# Patient Record
Sex: FEMALE | Race: WHITE | URBAN - METROPOLITAN AREA
[De-identification: names, ages, dates, MRNs, and addresses within clinical notes are randomized per-mention and may not be internally consistent; named-entity substitution may affect disease eponyms.]

---

## 2017-11-08 ENCOUNTER — OFFICE VISIT - RIVER FALLS (OUTPATIENT)
Dept: FAMILY MEDICINE | Facility: CLINIC | Age: 82
End: 2017-11-08

## 2017-11-08 ASSESSMENT — MIFFLIN-ST. JEOR: SCORE: 1295.64

## 2019-01-01 ENCOUNTER — NURSE TRIAGE (OUTPATIENT)
Dept: NURSING | Facility: CLINIC | Age: 84
End: 2019-01-01

## 2019-01-01 ENCOUNTER — OFFICE VISIT - RIVER FALLS (OUTPATIENT)
Dept: FAMILY MEDICINE | Facility: CLINIC | Age: 84
End: 2019-01-01

## 2019-01-01 ENCOUNTER — COMMUNICATION - RIVER FALLS (OUTPATIENT)
Dept: FAMILY MEDICINE | Facility: CLINIC | Age: 84
End: 2019-01-01

## 2019-01-01 LAB
A/G RATIO - HISTORICAL: 1 (ref 1–2.5)
ALBUMIN SERPL-MCNC: 2.8 GM/DL (ref 3.6–5.1)
ALP SERPL-CCNC: 100 UNIT/L (ref 33–130)
ALT SERPL W P-5'-P-CCNC: 19 UNIT/L (ref 6–29)
AST SERPL W P-5'-P-CCNC: 20 UNIT/L (ref 10–35)
BILIRUB SERPL-MCNC: 1.7 MG/DL (ref 0.2–1.2)
BUN SERPL-MCNC: 28 MG/DL (ref 7–25)
BUN/CREAT RATIO - HISTORICAL: 35 (ref 6–22)
CALCIUM SERPL-MCNC: 8.8 MG/DL (ref 8.6–10.4)
CHLORIDE BLD-SCNC: 91 MMOL/L (ref 98–110)
CO2 SERPL-SCNC: 23 MMOL/L (ref 20–32)
CREAT SERPL-MCNC: 0.81 MG/DL (ref 0.6–0.88)
EGFRCR SERPLBLD CKD-EPI 2021: 65 ML/MIN/1.73M2
ERYTHROCYTE [DISTWIDTH] IN BLOOD BY AUTOMATED COUNT: 14.3 % (ref 11–15)
GLOBULIN: 2.9 (ref 1.9–3.7)
GLUCOSE BLD-MCNC: 75 MG/DL (ref 65–99)
HCT VFR BLD AUTO: 38.9 % (ref 35–45)
HGB BLD-MCNC: 13.4 GM/DL (ref 11.7–15.5)
MCH RBC QN AUTO: 30.3 PG (ref 27–33)
MCHC RBC AUTO-ENTMCNC: 34.4 GM/DL (ref 32–36)
MCV RBC AUTO: 88 FL (ref 80–100)
PLATELET # BLD AUTO: 227 10*3/UL (ref 140–400)
PMV BLD: 10.4 FL (ref 7.5–12.5)
POTASSIUM BLD-SCNC: 4.4 MMOL/L (ref 3.5–5.3)
PROT SERPL-MCNC: 5.7 GM/DL (ref 6.1–8.1)
RBC # BLD AUTO: 4.42 10*6/UL (ref 3.8–5.1)
SODIUM SERPL-SCNC: 133 MMOL/L (ref 135–146)
TSH SERPL DL<=0.005 MIU/L-ACNC: 6.84 MIU/L (ref 0.4–4.5)
WBC # BLD AUTO: 8.3 10*3/UL (ref 3.8–10.8)

## 2019-07-19 NOTE — TELEPHONE ENCOUNTER
Reason for call; Pharmacist called for prior authorization for Rx that was order yesterday  .   Recommendation / teaching ; FNA reviewed Epic  chart and advised pharmacist  That last Rx visible Rx  Order on my form of Epic was from  [facility] provider  Dr Frank Hernandez on 9/20/2011 .      Caller Verbalizes understanding and denies further questions and will call back if further symptoms to triage or questions  .  Leora Suggs RN  - [place] Nurse Advisor

## 2022-02-11 VITALS
SYSTOLIC BLOOD PRESSURE: 126 MMHG | HEART RATE: 72 BPM | HEART RATE: 72 BPM | TEMPERATURE: 97.7 F | DIASTOLIC BLOOD PRESSURE: 76 MMHG | DIASTOLIC BLOOD PRESSURE: 64 MMHG | SYSTOLIC BLOOD PRESSURE: 116 MMHG

## 2022-02-11 VITALS
HEART RATE: 86 BPM | OXYGEN SATURATION: 97 % | BODY MASS INDEX: 39.38 KG/M2 | HEIGHT: 61 IN | WEIGHT: 208.6 LBS | DIASTOLIC BLOOD PRESSURE: 68 MMHG | SYSTOLIC BLOOD PRESSURE: 120 MMHG

## 2022-02-16 NOTE — PROGRESS NOTES
Patient:   OTILIA GARVIN            MRN: [id number]            FIN: [id number]               Age:   86 years     Sex:  Female     :  1931   Associated Diagnoses:   Hypothyroid; Hearing loss; Dizziness   Author:   Peterson Barrios MD      Chief Complaint   2017 11:34 AM CST   Patient here for a thyroid check.      History of Present Illness   Patient here for thyroid follow up. Has been generally doing well. Occasionally having issues with dizziness. Has been less active. Notes unable to hear from left ear at all. Hearing in right ear is preserved. Has not had audiology testing.   Son notes she has been less active. Not walking regularly. No falls. She denies that dizziness prevents walking. Notes that she tries to get some activity in her home.      Health Status   Allergies:    Allergic Reactions (All)  Severity Not Documented  Shrimp (No reactions were documented)  Sulfa drug (No reactions were documented)   Medications:  (Selected)   Prescriptions  Prescribed  levothyroxine 75 mcg (0.075 mg) oral tablet: 1 tab(s) ( 75 mcg ), po, daily, # 90 tab(s), 3 Refill(s), Type: Maintenance, Pharmacy: [facility], 1 tab(s) po daily  Documented Medications  Documented  Caltrate 600 with D oral tablet: daily, 0 Refill(s), Type: Maintenance  L-Lysine: L-Lysine, daily, PRN: influenza, 0 Refill(s), Type: Maintenance  Magnesium/Calcium/Zinc vitamin: Magnesium/Calcium/Zinc vitamin, daily, 0 Refill(s), Type: Maintenance  Vitamin C: Instructions: 1-2 qd, PRN: Aspiri, 0 Refill(s), Type: Maintenance  aspirin 325 mg oral tablet: 1 tab(s) ( 325 mg ), PO, Daily, Instructions: 1-2 qd, # 30 tab(s), 0 Refill(s), Type: Maintenance  multivitamin additive: daily, Instructions: With Vitamin B, tab(s), 0 Refill(s), Type: Maintenance  potassium acetate: 1 tab, daily, PRN: as needed, 0 Refill(s), Type: Maintenance   Problem list:    All Problems  Hypothyroid / SNOMED CT 451281449 / Confirmed  Obesity / ICD-9-CM  278.00 / Probable      Histories   Past Medical History:    No active or resolved past medical history items have been selected or recorded.   Family History:    No family history items have been selected or recorded.   Procedure history:    No active procedure history items have been selected or recorded.      Physical Examination   Vital Signs   11/8/2017 11:34 AM CST Peripheral Pulse Rate 86 bpm    Systolic Blood Pressure 120 mmHg    Diastolic Blood Pressure 68 mmHg    Mean Arterial Pressure 85 mmHg    Oxygen Saturation 97 %      Measurements from flowsheet : Measurements   11/8/2017 11:34 AM CST Height Measured - Standard 60.5 in    Weight Measured - Standard 208.6 lb    BSA 2.01 m2    Body Mass Index 40.06 kg/m2      General:  Alert and oriented, No acute distress.    HENT:  wax in right canal removed by CSS staff, exam otherwise normal.    Neck:  No thyromegaly.    Respiratory:  Lungs are clear to auscultation, Respirations are non-labored.    Cardiovascular:  Normal rate, Regular rhythm.       Impression and Plan   Diagnosis     Hypothyroid (TUO29-GF E03.4).     Course:  Progressing as expected.    Orders     TSH today. Will send refill of medication based on results of today's testing..     Diagnosis     Hearing loss (LAO06-YV H91.90).     Dizziness (BZL20-JI R42).     Course:  description of dizziness seems to be more motion sickness but concerning given sense of unilateral hearing loss. Will refer for audiology testing..

## 2022-02-16 NOTE — TELEPHONE ENCOUNTER
---------------------  From: Gabi Monaco CMA (Phone Messages [employer] [id number])   To: Peterson Barrios MD;     Sent: 3/15/2019 1:37:10 PM CDT !  Subject: Phone Message : Questions     PCP:   PETERSON      Time of Call:  1:06pm       Person Calling:  Torsten Sims  Phone number:  [phone]  Leave a detailed Message: yes    Returned call at: 1:20pm    Note:   Patients son Bethany called he is concerned about the amount of water weight that patient has gained, she is having issues with walking and fell yesterday. Advised son that patient is due to come in for an appt as she has not been seen since November of 2017, he states that he has a hard time transferring patient into a car due to the weight gain he did not state how much weight patient has gained. Bethany also stated that patient is currently not taking any of her medications including her Levothyroxine. Bethany would like a call back from [facility] to discuss this matter.    Last office visit and reason:  11/8/17      Pharmacy:     FWD to: DAVIalled Bethany at 759am. Worried about weight gain over past several months. Stopped taking levothyroxine because she didn't want to come to the doctor. Will restart levothyroxine and follow up for lab level in 4 weeks to make sure dose is adequate.---------------------  From: Peterson Barrios MD   To: Phone Messages [employer] ([id number][facility]);     Sent: 3/18/2019 8:04:18 AM CDT  Subject: RE: Phone Message : Questions---------------------  From: Altagracia Hemphill MA (Phone [employer] Pool ([id number]_[facility]))   To: Peterson Barrios MD;     Sent: 3/18/2019 1:45:37 PM CDT  Subject: RE: Phone Message : Questions     Bethany called and left a message, would like to talk to you again to see if there is anything else they can be doing.Returned Bethany's call at 1136am 3/19/19. Bethany has noticed some swelling in the neck, fatigue, weakness. Reviewed signs of hypothyroidism and Bethany notes they fit. Started medication today. Follow up in one month  for lab, sooner as needed.

## 2022-02-16 NOTE — PROGRESS NOTES
Patient:   OTILIA GARVIN            MRN: [id number]            FIN: [id number]               Age:   87 years     Sex:  Female     :  1931   Associated Diagnoses:   Hypothyroid; Nausea; Fatigue   Author:   Boaz Reid MD      Visit Information      Date of Service: 2019 04:09 pm  Performing Location: [facility]  Encounter#: [id number]      Primary Care Provider (PCP):  Peterson Barrios MD    NPI# [id number]      Referring Provider:  Boaz Reid MD    NPI# [id number]      Chief Complaint   2019 4:29 PM CDT     POA Paperwork        Review of Systems   Constitutional:  Weakness, Fatigue, Decreased activity, No fever.    Eye:  Negative.    Ear/Nose/Mouth/Throat:  No nasal congestion.    Respiratory:  No cough.    Gastrointestinal:  Nausea, decreased BMs son thinks due to decreased intake, stools are not hard, no blood, No vomiting.    Neurologic:  in wheelchair, unable to walk, less active and weaker.       Health Status   Allergies:    Allergic Reactions (Selected)  Severity Not Documented  Shrimp (No reactions were documented)  Sulfa drug (No reactions were documented)   Medications:  (Selected)   Prescriptions  Prescribed  Zofran 4 mg oral tablet: = 1 tab(s) ( 4 mg ), PO, q8 hrs, # 30 tab(s), 2 Refill(s), Type: Maintenance, Pharmacy: [facility], 1 tab(s) Oral q8 hrs  levothyroxine 75 mcg (0.075 mg) oral tablet: = 1 tab(s) ( 75 mcg ), po, daily, # 30 tab(s), 5 Refill(s), Type: Maintenance, Pharmacy: [id number], 1 tab(s) Oral daily  mirtazapine 15 mg oral tablet: = 1 tab(s) ( 15 mg ), Oral, hs, # 30 tab(s), 5 Refill(s), Type: Maintenance, Pharmacy: [facility], 1 tab(s) Oral hs  Documented Medications  Documented  aspirin 325 mg oral tablet: 1 tab(s) ( 325 mg ), PO, Daily, Instructions: 1-2 qd, # 30 tab(s), 0 Refill(s), Type: Maintenance   Problem list:    All Problems  Hypothyroid / SNOMED CT 090210497 / Confirmed  Obesity /  "ICD-9-.00 / Probable      Histories   Past Medical History:    No active or resolved past medical history items have been selected or recorded.   Family History:    No family history items have been selected or recorded.   Procedure history:    Vertebroplasty (SNOMED CT 2799656063) on 6/2/2019 at 87 Years.  Comments:  6/13/2019 1:45 PM CDT - Neva Croft  Compression fracture of L1 lumbar vertebra.   Social History:        Alcohol Assessment: Past                     Comments:                      12/08/2015 - Charmaine CAMPOS [place]ila                     Denies alcohol use      Tobacco Assessment: Denies Tobacco Use            Never, Household tobacco concerns: No.      Substance Abuse Assessment            Never      Employment and Education Assessment            Retired      Exercise and Physical Activity Assessment: Regular exercise            Exercise frequency: 3-4 times/week.        Physical Examination   Vital Signs   8/6/2019 4:29 PM CDT Peripheral Pulse Rate 72 bpm    Pulse Site Radial artery    HR Method Manual    Systolic Blood Pressure 116 mmHg    Diastolic Blood Pressure 64 mmHg    Mean Arterial Pressure 81 mmHg    BP Site Left arm    BP Method Manual      Measurements from flowsheet : Measurements   8/6/2019 4:29 PM CDT     Ht/Wt Measurement Refused by Patient?     Yes     General:  patient is alert, oriented to self, thinks she is in [place] can't think of what type of building she is in, knows it is 2019, Lauren is president does not know month or day of the week  She is \"afraid.\".    Eye:  Pupils are equal, round and reactive to light, Extraocular movements are intact, Normal conjunctiva.    HENT:  Normocephalic, Oral mucosa is moist, No pharyngeal erythema.    Neck:  Supple, Non-tender, No lymphadenopathy, No thyromegaly.    Respiratory:  Lungs are clear to auscultation.    Cardiovascular:  Normal rate, Regular rhythm, No murmur, Normal peripheral perfusion.    Gastrointestinal:  Soft, " Non-tender, Non-distended, Normal bowel sounds.    Integumentary:  Warm, Dry, Pink.       Impression and Plan   Diagnosis     Hypothyroid (AEH55-NQ E03.4).     Nausea (PYJ13-RY R11.0).     Fatigue (YTJ80-CJ R53.83).     Plan:  Form counter signed, She is requesting Bethany be her Healthcare POA..       Professional Services   Counseling Summary:  This was a 20 minute visit with greater than 50% of that time spent counseling the patient.    Counseling included differential diagnoses, treatment options, and lifestyle changes.    The patient was interactive, and attentive.    Family present included son.

## 2022-02-16 NOTE — LETTER
(Inserted Image. Unable to display)   April 15, 2019      OTILIA GARVIN   [place], [place] [id number]        Dear OTILIA BACK,      Thank you for selecting [facility] (previously [facility]) for your healthcare needs.     Our records indicate you are due for the following services:     Non-Fasting Lab    If you had your labs done at another facility or with [facility] at [facility], please bring in a copy of the results to your next visit, mail a copy, or drop off a copy of your results to your Healthcare Provider.    To schedule an appointment or if you have further questions, please contact your primary clinic:   [facility]          [phone]   [facility]    [phone]             [facility]         [phone]      Powered by [employer]    Sincerely,    Peterson Barrios M.D.

## 2022-02-16 NOTE — NURSING NOTE
Comprehensive Intake Entered On:  8/6/2019 4:34 PM CDT    Performed On:  8/6/2019 4:29 PM CDT by Altagracia Hemphill MA               Summary   Chief Complaint :   POA Paperwork   Ht/Wt Measurement Refused by Patient? :   Yes   Systolic Blood Pressure :   116 mmHg   Diastolic Blood Pressure :   64 mmHg   Mean Arterial Pressure :   81 mmHg   Peripheral Pulse Rate :   72 bpm   BP Site :   Left arm   Pulse Site :   Radial artery   BP Method :   Manual   HR Method :   Manual   Altagracia Hemphill MA - 8/6/2019 4:29 PM CDT   Health Status   Allergies Verified? :   Yes   Medication History Verified? :   Yes   Immunizations Current :   Other: Declines immunizations.   Medical History Verified? :   Yes   Pre-Visit Planning Status :   Completed   Tobacco Use? :   Never smoker   Altagracia Hemphill MA - 8/6/2019 4:29 PM CDT   Consents   Consent for Immunization Exchange :   Consent Granted   Consent for Immunizations to Providers :   Consent Granted   Altagracia Hemphill MA - 8/6/2019 4:29 PM CDT   Meds / Allergies   (As Of: 8/6/2019 4:34:13 PM CDT)   Allergies (Active)   Shrimp  Estimated Onset Date:   Unspecified ; Created By:   [place] Montano LPN; Reaction Status:   Active ; Substance:   Shrimp ; Updated By:   Gila Montano LPN; Reviewed Date:   8/6/2019 4:30 PM CDT      sulfa drug  Estimated Onset Date:   Unspecified ; Created By:   [place] Montano LPN; Reaction Status:   Active ; Category:   Drug ; Substance:   sulfa drug ; Type:   Allergy ; Updated By:   [place] Montano LPN; Reviewed Date:   8/6/2019 4:30 PM CDT        Medication List   (As Of: 8/6/2019 4:34:13 PM CDT)   Prescription/Discharge Order    levothyroxine  :   levothyroxine ; Status:   Prescribed ; Ordered As Mnemonic:   levothyroxine 75 mcg (0.075 mg) oral tablet ; Simple Display Line:   75 mcg, 1 tab(s), po, daily, 30 tab(s), 5 Refill(s) ; Ordering Provider:   Peterson Barrios MD; Catalog Code:   levothyroxine ; Order Dt/Tm:   7/30/2019 4:02:10 PM          mirtazapine   :   mirtazapine ; Status:   Prescribed ; Ordered As Mnemonic:   mirtazapine 15 mg oral tablet ; Simple Display Line:   15 mg, 1 tab(s), Oral, hs, 30 tab(s), 5 Refill(s) ; Ordering Provider:   Peterson Barrios MD; Catalog Code:   mirtazapine ; Order Dt/Tm:   7/30/2019 3:57:39 PM          ondansetron  :   ondansetron ; Status:   Prescribed ; Ordered As Mnemonic:   Zofran 4 mg oral tablet ; Simple Display Line:   4 mg, 1 tab(s), PO, q8 hrs, 30 tab(s), 2 Refill(s) ; Ordering Provider:   Peterson Barrios MD; Catalog Code:   ondansetron ; Order Dt/Tm:   7/30/2019 3:54:33 PM            Home Meds    aspirin  :   aspirin ; Status:   Documented ; Ordered As Mnemonic:   aspirin 325 mg oral tablet ; Simple Display Line:   325 mg, 1 tab(s), PO, Daily, 1-2 qd, 30 tab(s) ; Catalog Code:   aspirin ; Order Dt/Tm:   10/12/2011 11:02:06 AM

## 2022-02-16 NOTE — PROGRESS NOTES
Patient:   OTILIA GARVIN            MRN: [id number]            FIN: [id number]               Age:   87 years     Sex:  Female     :  1931   Associated Diagnoses:   Hypothyroid; Nausea; Fatigue   Author:   Peterson Barrios MD      Chief Complaint   2019 3:28 PM CDT    POA Paperwork, discuss paperwork      History of Present Illness   patient is here with son to reestablish care and discuss several concerns  was in nursing home until 10 days ago  has been slowly getting weaker and more fatigued there, so son brought her home thinking she might improve  however, over the past 10 days having more weakness, more confusion  not eating well, complains that nothing tastes good, hard to get her to drink fluids  no complaints of abdominal pain, no fevers, complains of generalized discomfort  son has hired home health aides and a nurse to come in and help him several hours a day  has been discussing given her decline whether hospice would be appropriate as she has been clear that she does not want to be in the hospital or the nursing home anymore  however, he would like to wait a little longer to see if being home in her own environment will eventually help her get stronger again  son notes that she has not been getting her levothyroxine for the past week as they can't find it at home         Review of Systems   Constitutional:  Weakness, Fatigue, Decreased activity, No fever.    Eye:  Negative.    Ear/Nose/Mouth/Throat:  No nasal congestion.    Respiratory:  No cough.    Gastrointestinal:  Nausea, decreased BMs son thinks due to decreased intake, stools are not hard, no blood, No vomiting.    Neurologic:  in wheelchair, unable to walk, less active and weaker.       Health Status   Allergies:    Allergic Reactions (All)  Severity Not Documented  Shrimp (No reactions were documented)  Sulfa drug (No reactions were documented)   Medications:  (Selected)   Prescriptions  Prescribed  levothyroxine 75 mcg (0.075 mg)  oral tablet: = 1 tab(s) ( 75 mcg ), po, daily, # 30 tab(s), 5 Refill(s), Type: Maintenance, Pharmacy: [id number], 1 tab(s) Oral daily, none for past week   Problem list:    All Problems  Hypothyroid / SNOMED CT 253959099 / Confirmed  Obesity / ICD-9-.00 / Probable      Histories   Past Medical History:    No active or resolved past medical history items have been selected or recorded.   Family History:    No family history items have been selected or recorded.   Procedure history:    Vertebroplasty (2324739373) on 6/2/2019 at 87 Years.  Comments:  6/13/2019 1:45 PM CDT - Neva Croft  Compression fracture of L1 lumbar vertebra.   Social History:        Alcohol Assessment: Past                     Comments:                      12/08/2015 - Gila Montano LPN                     Denies alcohol use      Tobacco Assessment: Denies Tobacco Use            Never, Household tobacco concerns: No.      Substance Abuse Assessment            Never      Employment and Education Assessment            Retired      Exercise and Physical Activity Assessment: Regular exercise            Exercise frequency: 3-4 times/week.      Physical Examination   Vital Signs   7/30/2019 3:28 PM CDT Temperature Tympanic 97.7 DegF  LOW    Peripheral Pulse Rate 72 bpm    Pulse Site Radial artery    HR Method Manual    Systolic Blood Pressure 126 mmHg    Diastolic Blood Pressure 76 mmHg    Mean Arterial Pressure 93 mmHg    BP Site Left arm    BP Method Manual      General:  patient is alert, oriented to self, thinks she is in [place], can't think of what type of building she is in, knows it is 2019, does not know month or day of the week.    Eye:  Pupils are equal, round and reactive to light, Extraocular movements are intact, Normal conjunctiva.    HENT:  Normocephalic, Oral mucosa is moist, No pharyngeal erythema.    Neck:  Supple, Non-tender, No lymphadenopathy, No thyromegaly.    Respiratory:  Lungs are clear to  auscultation.    Cardiovascular:  Normal rate, Regular rhythm, No murmur, Normal peripheral perfusion.    Gastrointestinal:  Soft, Non-tender, Non-distended, Normal bowel sounds.    Integumentary:  Warm, Dry, Pink.       Impression and Plan   Diagnosis     Hypothyroid (MLI11-GR E03.4).     Nausea (CYK63-DS R11.0).     Fatigue (RSJ15-GZ R53.83).     Plan:  will check labs, trial of antidepressant and anti nausea medication, follow up if not improving, patient is not competent to make her own decisions due to confusion, will need to return when second physician is available to be second signiature on incapacitation form. Discussed option of pursuing hospice with son although may need to further evaluate underlying cause of symptoms to rule out reversible causes..

## 2022-02-16 NOTE — TELEPHONE ENCOUNTER
---------------------  From: Gabi Monaco CMA (Phone Messages Pool ([id number]_[facility]))   To: Peterson Barrios MD;     Sent: 5/31/2019 11:34:00 AM CDT  Subject: Phone Message : Hosptial Admission      PCP:   PETERSON      Time of Call:  10:44am       Person Calling:  Bethany - son  Phone number:  [phone]  Leave a detailed Message:     Returned call at: 1120am    Note:   Patients son called, he wanted to let KWL know that patient had been admitted to the [facility] yesterday for possible UTI and weakness. I did review epic patient also has two fractures to her vertebra and an UTI she also had her TSH drawn. Per her son Bethany patient has been out of thyroid medication for two days. Bethany is also concerned about making sure that patient stays in the hospital until Monday due to insurance. He would also like [facility] to call him to discuss this matter as well.     Bethany is aware that PETERSON is out of the clinic today.     Please advise.     Last office visit and reason:  _     Pharmacy: _    FWD to: _Just checked hospital records and appears she was transferred to [facility] over the weekend for ongoing care. Please confirm with son that his concerns were addressed. Thanks.---------------------  From: Peterson Barrios MD   To: Phone Messages [employer] ([id number]_ALEJANDRINA Gamez);     Sent: 6/3/2019 8:11:19 AM CDT  Subject: RE: Phone Message : Hosptial AdmissionTime: 1:35pm  Note: Left message to call clinic

## 2022-02-16 NOTE — NURSING NOTE
Comprehensive Intake Entered On:  7/30/2019 3:37 PM CDT    Performed On:  7/30/2019 3:28 PM CDT by Altagracia Hemphill MA               Summary   Chief Complaint :   POA Paperwork, discuss paperwork    Systolic Blood Pressure :   126 mmHg   Diastolic Blood Pressure :   76 mmHg   Mean Arterial Pressure :   93 mmHg   Peripheral Pulse Rate :   72 bpm   BP Site :   Left arm   Pulse Site :   Radial artery   BP Method :   Manual   HR Method :   Manual   Temperature Tympanic :   97.7 DegF(Converted to: 36.5 DegC)  (LOW)    Altagracia Hemphill MA - 7/30/2019 3:28 PM CDT   Health Status   Allergies Verified? :   Yes   Medication History Verified? :   Yes   Immunizations Current :   Other: Declines immunizations.   Medical History Verified? :   Yes   Pre-Visit Planning Status :   Completed   Tobacco Use? :   Never smoker   Altagracia Hemphill MA - 7/30/2019 3:28 PM CDT   Consents   Consent for Immunization Exchange :   Consent Granted   Consent for Immunizations to Providers :   Consent Granted   Altagracia Hemphill MA - 7/30/2019 3:28 PM CDT   Meds / Allergies   (As Of: 7/30/2019 3:37:30 PM CDT)   Allergies (Active)   Shrimp  Estimated Onset Date:   Unspecified ; Created By:   [facility] Montano LPN; Reaction Status:   Active ; Substance:   Shrimp ; Updated By:   [place] Montano LPN; Reviewed Date:   7/30/2019 3:34 PM CDT      sulfa drug  Estimated Onset Date:   Unspecified ; Created By:   [place] Montano LPN; Reaction Status:   Active ; Category:   Drug ; Substance:   sulfa drug ; Type:   Allergy ; Updated By:   [place] Montano LPN; Reviewed Date:   7/30/2019 3:34 PM CDT        Medication List   (As Of: 7/30/2019 3:37:30 PM CDT)   Prescription/Discharge Order    levothyroxine  :   levothyroxine ; Status:   Prescribed ; Ordered As Mnemonic:   levothyroxine 75 mcg (0.075 mg) oral tablet ; Simple Display Line:   75 mcg, 1 tab(s), po, daily, 30 tab(s), 1 Refill(s) ; Ordering Provider:   Peterson Barrios MD; Catalog Code:   levothyroxine ; Order  Dt/Tm:   3/18/2019 8:02:13 AM            Home Meds    aspirin  :   aspirin ; Status:   Documented ; Ordered As Mnemonic:   aspirin 325 mg oral tablet ; Simple Display Line:   325 mg, 1 tab(s), PO, Daily, 1-2 qd, 30 tab(s) ; Catalog Code:   aspirin ; Order Dt/Tm:   10/12/2011 11:02:06 AM

## 2022-02-16 NOTE — NURSING NOTE
Date of last office visit and reason:  11/9/17      Date of last Med Check / Px:   11/9/17  Date of last labs pertaining to med:  11/9/17    RTC order in chart:  Yes Due Now    For Protocol refill, has patient been contacted:  Patient called to have medication refilled, I let her know I could fill it for one month and she needs to be seen. Patient agreed and will make appointment.

## 2022-02-16 NOTE — TELEPHONE ENCOUNTER
---------------------  From: Lizbet Virgen CMA (Phone Messages Pool ([id number]_[facility]))   To: Peterson Barrios MD;     Sent: 7/15/2019 7:26:54 PM CDT  Subject: Hospice     Phone Message    PCP:   PETERSON      Time of Call:  1633       Person Calling:  pt's son, Bethany  Phone number:  [phone]    Returned call at: _    Note:   Bethany states pt is doing so bad that they want to take her out of the nursing home into home hospice. Wondering how to proceed with that.     Last office visit and reason:  11/8/17 thyroid/hearing loss KATHI called Bethany. Patient with significant pain. Moving home. Unsure if she is going to decline and need hospice. Encouraged them to keep working with her nursing home in [place] to arrange what they can, but if needed I am happy to see her to assist.

## 2022-02-16 NOTE — LETTER
(Inserted Image. Unable to display)   [address] [place] [address]  August 01, 2019      OTILIA BACK [address]       [place], [place] [id number]      Dear OTILIA BACK,    Thank you for selecting [facility] for your healthcare needs. Below you will find the results of the recent tests done at our clinic.     Your mom's lab results are listed below. I tried to reach you by phone on Thursday, 8/1. Feel free to call if you would like to discuss.    Result Name Current Result Previous Result Reference Range   Sodium Level (mmol/L) ((L)) 133 7/30/2019  135 - 146   Potassium Level (mmol/L)  4.4 7/30/2019  3.5 - 5.3   Chloride Level (mmol/L) ((L)) 91 7/30/2019  98 - 110   CO2 Level (mmol/L)  23 7/30/2019  20 - 32   Glucose Level (mg/dL)  75 7/30/2019  65 - 99   BUN (mg/dL) ((H)) 28 7/30/2019  7 - 25   Creatinine Level (mg/dL)  0.81 7/30/2019  0.60 - 0.88   BUN/Creat Ratio ((H)) 35 7/30/2019  6 - 22   eGFR (mL/min/1.73m2)  65 7/30/2019  > OR = 60 -    eGFR  (mL/min/1.73m2)  76 7/30/2019  > OR = 60 -    Calcium Level (mg/dL)  8.8 7/30/2019  8.6 - 10.4   Bilirubin Total (mg/dL) ((H)) 1.7 7/30/2019  0.2 - 1.2   Alkaline Phosphatase (unit/L)  100 7/30/2019  33 - 130   AST/SGOT (unit/L)  20 7/30/2019  10 - 35   ALT/SGPT (unit/L)  19 7/30/2019  6 - 29   Protein Total (gm/dL) ((L)) 5.7 7/30/2019  6.1 - 8.1   Albumin Level (gm/dL) ((L)) 2.8 7/30/2019  3.6 - 5.1   Globulin  2.9 7/30/2019  1.9 - 3.7   A/G Ratio  1.0 7/30/2019  1.0 - 2.5   TSH (mIU/L) ((H)) 6.84 7/30/2019  3.08 11/8/2017 0.40 - 4.50   WBC  8.3 7/30/2019  3.8 - 10.8   RBC  4.42 7/30/2019  3.80 - 5.10   Hgb (gm/dL)  13.4 7/30/2019  11.7 - 15.5   Hct (%)  38.9 7/30/2019  35.0 - 45.0   MCV (fL)  88.0 7/30/2019  80.0 - 100.0   MCH (pg)  30.3 7/30/2019  27.0 - 33.0   MCHC (gm/dL)  34.4 7/30/2019  32.0 - 36.0   RDW (%)  14.3 7/30/2019  11.0 - 15.0   Platelet  227 7/30/2019  140 - 400   MPV (fL)  10.4 7/30/2019  7.5 -  12.5       Please contact me or my assistant at [phone] if you have any questions or concerns.     Sincerely,        Peterson Barrios M.D.